# Patient Record
Sex: MALE | Race: WHITE | NOT HISPANIC OR LATINO | Employment: FULL TIME | URBAN - METROPOLITAN AREA
[De-identification: names, ages, dates, MRNs, and addresses within clinical notes are randomized per-mention and may not be internally consistent; named-entity substitution may affect disease eponyms.]

---

## 2020-08-06 LAB
EXTERNAL HIV CONFIRMATION: NORMAL
EXTERNAL HIV SCREEN: NORMAL

## 2022-08-05 NOTE — PROGRESS NOTES
ADULT ANNUAL [address] IN PARTNERSHIP WITH [facility]    NAME: Gideon Kanner  AGE: 42 y o  SEX: male  : 1975     DATE: 2022     Assessment and Plan:     Problem List Items Addressed This Visit    None     Visit Diagnoses     Annual physical exam    -  Primary    Relevant Orders    CBC and differential    Comprehensive metabolic panel    TSH, 3rd generation with Free T4 reflex    Elevated blood pressure reading in office without diagnosis of hypertension        Overweight (BMI 25 0-29  9)        Relevant Orders    Lipid panel    POCT hemoglobin A1c (Completed)    Need for hepatitis C screening test        Relevant Orders    Hepatitis C antibody          Immunizations and preventive care screenings were discussed with patient today  Appropriate education was printed on patient's after visit summary  Discussed elevated blood pressure  Recommend to start bp log at home  Record pressures and keep log to send through [email or web address] in one month  Will recommend sooner follow up if needed for those results  Patient would like to continue with diet and exercise before considering medications  Follow up for bp recheck at next visit  POC a1c was 5 1  Discussed results with patient in office  Counseling:  Counseled on exercise 150 mins 4-5 days a week  Recommend less than 1 gram of sodium daily  Include 4-5 servings of fruits and vegetables for each day's meals  Return in about 6 months (around 2023) for Recheck  Chief Complaint:     Chief Complaint   Patient presents with    Physical Exam    BMI follow up      History of Present Illness:     Adult Annual Physical   Patient here for a comprehensive physical exam  The patient reports no problems  Diet and Physical Activity  · Diet/Nutrition: well balanced diet, consuming 3-5 servings of fruits/vegetables daily and adequate fiber intake     · Exercise: walking, 3-4 times a week on average and 30-60 minutes on average  Depression Screening  PHQ-2/9 Depression Screening    Little interest or pleasure in doing things: 0 - not at all  Feeling down, depressed, or hopeless: 0 - not at all  PHQ-2 Score: 0  PHQ-2 Interpretation: Negative depression screen       General Health  · Sleep: sleeps well  · Hearing: normal - bilateral   · Vision: goes for regular eye exams and wears glasses  · Dental: regular dental visits and brushes teeth twice daily   Health  · Symptoms include: none     Review of Systems:     Review of Systems   HENT: Negative for ear pain  Respiratory: Negative for cough and shortness of breath  Cardiovascular: Negative for chest pain  Gastrointestinal: Negative for abdominal pain, constipation, diarrhea, nausea and vomiting  Genitourinary: Negative for dysuria  Skin: Negative for rash  All other systems reviewed and are negative  Past Medical History:     Past Medical History:   Diagnosis Date    Allergic rhinitis     Chronic colitis     Chronic cough     Diverticulitis 2021    Hyperlipidemia     Hypertension     Oral candidiasis     Serous otitis media     Sinusitis       Past Surgical History:     History reviewed  No pertinent surgical history     Family History:     Family History   Problem Relation Age of Onset    Hypertension Mother     Heart failure Mother     Depression Mother     Heart attack Father       Social History:     Social History     Socioeconomic History    Marital status: /Civil Union     Spouse name: None    Number of children: None    Years of education: None    Highest education level: None   Occupational History    None   Tobacco Use    Smoking status: Former Smoker     Quit date:      Years since quittin 6    Smokeless tobacco: Never Used   Vaping Use    Vaping Use: Never used   Substance and Sexual Activity    Alcohol use: Not Currently    Drug use: Never    Sexual activity: Yes   Other Topics Concern    None   Social History Narrative    None     Social Determinants of Health     Financial Resource Strain: Not on file   Food Insecurity: Not on file   Transportation Needs: Not on file   Physical Activity: Not on file   Stress: Not on file   Social Connections: Not on file   Intimate Partner Violence: Not on file   Housing Stability: Not on file      Current Medications:     No current outpatient medications on file  No current facility-administered medications for this visit  Allergies: Allergies   Allergen Reactions    Clarithromycin Other (See Comments)    Penicillins Rash    Sulfamethoxazole-Trimethoprim Rash      Physical Exam:     /98 (BP Location: Left arm, Patient Position: Sitting, Cuff Size: Large)   Pulse 85   Temp 98 2 °F (36 8 °C)   Ht 6' 1" (1 854 m)   Wt 89 4 kg (197 lb)   SpO2 98%   BMI 25 99 kg/m²     Physical Exam  Vitals and nursing note reviewed  Constitutional:       Appearance: Normal appearance  HENT:      Head: Normocephalic and atraumatic  Right Ear: Tympanic membrane, ear canal and external ear normal       Left Ear: Tympanic membrane, ear canal and external ear normal       Nose: Nose normal       Mouth/Throat:      Mouth: Mucous membranes are moist       Pharynx: Oropharynx is clear  No posterior oropharyngeal erythema  Eyes:      Extraocular Movements: Extraocular movements intact  Conjunctiva/sclera: Conjunctivae normal       Pupils: Pupils are equal, round, and reactive to light  Cardiovascular:      Rate and Rhythm: Normal rate and regular rhythm  Heart sounds: Normal heart sounds  No murmur heard  No friction rub  No gallop  Pulmonary:      Effort: Pulmonary effort is normal  No respiratory distress  Breath sounds: Normal breath sounds  No wheezing  Abdominal:      General: Bowel sounds are normal       Palpations: Abdomen is soft  Tenderness: There is no abdominal tenderness  There is no guarding  Musculoskeletal:         General: Normal range of motion  Cervical back: Normal range of motion and neck supple  Lymphadenopathy:      Cervical: No cervical adenopathy  Skin:     General: Skin is warm and dry  Findings: No erythema or rash  Neurological:      General: No focal deficit present  Mental Status: He is alert and oriented to person, place, and time  Mental status is at baseline  Psychiatric:         Mood and Affect: Mood normal          Behavior: Behavior normal          Thought Content:  Thought content normal          Judgment: Judgment normal           [address] [facility]

## 2022-08-08 ENCOUNTER — TELEPHONE (OUTPATIENT)
Dept: ADMINISTRATIVE | Facility: OTHER | Age: 47
End: 2022-08-08

## 2022-08-08 ENCOUNTER — OFFICE VISIT (OUTPATIENT)
Dept: FAMILY MEDICINE CLINIC | Facility: CLINIC | Age: 47
End: 2022-08-08
Payer: COMMERCIAL

## 2022-08-08 VITALS
TEMPERATURE: 98.2 F | DIASTOLIC BLOOD PRESSURE: 98 MMHG | HEIGHT: 73 IN | HEART RATE: 85 BPM | SYSTOLIC BLOOD PRESSURE: 142 MMHG | OXYGEN SATURATION: 98 % | WEIGHT: 197 LBS | BODY MASS INDEX: 26.11 KG/M2

## 2022-08-08 DIAGNOSIS — Z11.59 NEED FOR HEPATITIS C SCREENING TEST: ICD-10-CM

## 2022-08-08 DIAGNOSIS — E66.3 OVERWEIGHT (BMI 25.0-29.9): ICD-10-CM

## 2022-08-08 DIAGNOSIS — Z00.00 ANNUAL PHYSICAL EXAM: Primary | ICD-10-CM

## 2022-08-08 DIAGNOSIS — R03.0 ELEVATED BLOOD PRESSURE READING IN OFFICE WITHOUT DIAGNOSIS OF HYPERTENSION: ICD-10-CM

## 2022-08-08 LAB — SL AMB POCT HEMOGLOBIN AIC: 5.1 (ref ?–6.5)

## 2022-08-08 PROCEDURE — 3725F SCREEN DEPRESSION PERFORMED: CPT | Performed by: STUDENT IN AN ORGANIZED HEALTH CARE EDUCATION/TRAINING PROGRAM

## 2022-08-08 PROCEDURE — 99386 PREV VISIT NEW AGE 40-64: CPT | Performed by: STUDENT IN AN ORGANIZED HEALTH CARE EDUCATION/TRAINING PROGRAM

## 2022-08-08 PROCEDURE — 83036 HEMOGLOBIN GLYCOSYLATED A1C: CPT | Performed by: STUDENT IN AN ORGANIZED HEALTH CARE EDUCATION/TRAINING PROGRAM

## 2022-08-08 NOTE — TELEPHONE ENCOUNTER
----- Message from Manson Hammans sent at 8/5/2022 12:13 PM EDT -----  Regarding: Care Gap Request  08/05/22 12:13 PM    Hello, our patient has had HIV completed/performed  Please assist in updating the patient chart by pulling the Care Everywhere (CE) document  The date of service is 8/6/2020       Thank you,  Manson Hammans  [address]

## 2022-08-08 NOTE — TELEPHONE ENCOUNTER
Upon review of the In Basket request we were able to locate, review, and update the patient chart as requested for HIV  Any additional questions or concerns should be emailed to the Practice Liaisons via [email or web address] email, please do not reply via In Basket      Thank you  Grayce Hodgkin, MA

## 2023-01-19 ENCOUNTER — OFFICE VISIT (OUTPATIENT)
Dept: FAMILY MEDICINE CLINIC | Facility: CLINIC | Age: 48
End: 2023-01-19

## 2023-01-19 VITALS
SYSTOLIC BLOOD PRESSURE: 124 MMHG | BODY MASS INDEX: 27.1 KG/M2 | OXYGEN SATURATION: 98 % | HEART RATE: 66 BPM | WEIGHT: 204.5 LBS | HEIGHT: 73 IN | DIASTOLIC BLOOD PRESSURE: 76 MMHG

## 2023-01-19 DIAGNOSIS — M54.6 ACUTE RIGHT-SIDED THORACIC BACK PAIN: Primary | ICD-10-CM

## 2023-01-19 PROBLEM — J30.9 ALLERGIC RHINITIS: Status: ACTIVE | Noted: 2023-01-19

## 2023-01-19 PROBLEM — M77.30 CALCANEAL SPUR: Status: ACTIVE | Noted: 2018-07-17

## 2023-01-19 PROBLEM — K57.30 SIGMOID DIVERTICULOSIS: Status: ACTIVE | Noted: 2023-01-19

## 2023-01-19 PROBLEM — M72.2 PLANTAR FASCIAL FIBROMATOSIS: Status: ACTIVE | Noted: 2018-07-17

## 2023-01-19 RX ORDER — CYCLOBENZAPRINE HCL 10 MG
10 TABLET ORAL
Qty: 21 TABLET | Refills: 0
Start: 2023-01-19

## 2023-01-19 RX ORDER — NAPROXEN 500 MG/1
500 TABLET ORAL 2 TIMES DAILY WITH MEALS
Qty: 30 TABLET | Refills: 0
Start: 2023-01-19 | End: 2023-02-03

## 2023-01-19 NOTE — PROGRESS NOTES
Name: Pat Varela      : 1975      MRN: [id number]  Encounter Provider: FELIPE Noe  Encounter Date: 2023   Encounter department: [address] [place] [address] in order of likelihood include: right rib fracture/contusion vs muscular strain/sprain  Cardiac and pulmonary physical exam are benign, vitals are stable  Educated patient that most instances of rib trauma do not require imaging or surgical interventions  Provided patient with the following medications from our office supply: 15-day regimen of naproxen and Flexeril as needed at bedtime  Provided patient educational handouts on the management of rib and back pain, encouraged patient to avoid any further trauma to the area  Advised patient to follow-up with   [facility] if his condition fails to improve after 2 weeks of treatment  1  Acute right-sided thoracic back pain  -     naproxen (Naprosyn) 500 mg tablet; Take 1 tablet (500 mg total) by mouth 2 (two) times a day with meals for 15 days  -     cyclobenzaprine (FLEXERIL) 10 mg tablet; Take 1 tablet (10 mg total) by mouth daily at bedtime as needed for muscle spasms         Subjective      Right Back Pain  Patient presents for evaluation of low back problems  Symptoms have been present for 1 week and include radiation to chest at night  Initial inciting event: hit back when demonstrating high jump  Symptoms are worse at nighttime  Alleviating factors identifiable by the patient are Advil  Aggravating factors identifiable by the patient are laying and walking down stairs  Treatments initiated by the patient: Advil    minimal improvement  Previous lower back problems: "pulled lower back when younger"  Previous work up: none  Previous treatments: none      Sharp/stabbing pain, at worse 8-9/10 reported pain, 2/10 at best  Hurts with deep breathing and coughing        Review of Systems Constitutional: Negative  HENT: Negative  Eyes: Negative  Respiratory: Negative  Cardiovascular: Positive for chest pain  Gastrointestinal: Negative  Negative for constipation  Endocrine: Negative  Genitourinary: Negative  Negative for dysuria  Musculoskeletal: Positive for back pain  Skin: Negative  Allergic/Immunologic: Negative  Neurological: Negative  Negative for weakness and numbness  Hematological: Negative  Psychiatric/Behavioral: Negative  No current outpatient medications on file prior to visit  Objective     /76 (BP Location: Left arm, Patient Position: Sitting, Cuff Size: Large)   Pulse 66   Ht 6' 1" (1 854 m)   Wt 92 8 kg (204 lb 8 oz)   SpO2 98%   BMI 26 98 kg/m²     Physical Exam  Constitutional:       General: He is not in acute distress  Appearance: Normal appearance  He is not ill-appearing  Eyes:      Extraocular Movements: Extraocular movements intact  Conjunctiva/sclera: Conjunctivae normal    Cardiovascular:      Rate and Rhythm: Normal rate and regular rhythm  Pulses: Normal pulses  Heart sounds: Normal heart sounds  Pulmonary:      Effort: Pulmonary effort is normal  No respiratory distress  Breath sounds: Normal breath sounds  No wheezing  Abdominal:      General: Abdomen is flat  Musculoskeletal:         General: Normal range of motion  Cervical back: Normal and normal range of motion  Thoracic back: Swelling and tenderness present  No deformity or bony tenderness  Normal range of motion  Lumbar back: Normal  No swelling, edema, deformity, signs of trauma, tenderness or bony tenderness  Normal range of motion  Negative right straight leg raise test and negative left straight leg raise test         Back:       Comments: Circled area: Location of tenderness and mild swelling, no bruising or redness noted   Skin:     Findings: No erythema or rash     Neurological:      Mental Status: He is alert and oriented to person, place, and time     Psychiatric:         Mood and Affect: Mood normal          Behavior: Behavior normal        FELIPE Chong

## 2023-01-19 NOTE — PATIENT INSTRUCTIONS
Back Pain   AMBULATORY CARE:   Back pain  is common  You may have back pain and muscle spasms  You may feel sore or stiff on one or both sides of your back  The pain may spread to your lower body  Conditions that affect the spine, joints, or muscles can cause back pain  These may include arthritis, spinal stenosis (narrowing of the spinal column), muscle tension, or breakdown of the spinal discs  Call your local emergency number (911 in the [address] [facility]) if:   You have severe back pain with chest pain  You cannot control your urine or bowel movements  Your pain becomes so severe that you cannot walk  Seek care immediately if:   You have pain, numbness, or weakness in one or both legs  You have severe back pain, nausea, and vomiting  You have severe back pain that spreads to your side or genital area  Call your doctor if:   You have back pain that does not get better with rest and pain medicine  You have a fever  You have pain that worsens when you are on your back or when you rest     You have pain that worsens when you cough or sneeze  You lose weight without trying  You have questions or concerns about your condition or care  Medicines: You may need any of the following:  NSAIDs , such as ibuprofen, help decrease swelling, pain, and fever  This medicine is available with or without a doctor's order  NSAIDs can cause stomach bleeding or kidney problems in certain people  If you take blood thinner medicine, always ask your healthcare provider if NSAIDs are safe for you  Always read the medicine label and follow directions  Acetaminophen  decreases pain and fever  It is available without a doctor's order  Ask how much to take and how often to take it  Follow directions  Read the labels of all other medicines you are using to see if they also contain acetaminophen, or ask your doctor or pharmacist  Acetaminophen can cause liver damage if not taken correctly   Do not use more than 4 grams (4,000 milligrams) total of acetaminophen in one day  Muscle relaxers  help decrease muscle spasms and back pain  Take your medicine as directed  Contact your healthcare provider if you think your medicine is not helping or if you have side effects  Tell him or her if you are allergic to any medicine  Keep a list of the medicines, vitamins, and herbs you take  Include the amounts, and when and why you take them  Bring the list or the pill bottles to follow-up visits  Carry your medicine list with you in case of an emergency  Manage your back pain:   Apply ice  on your back for 15 to 20 minutes every hour or as directed  Use an ice pack, or put crushed ice in a plastic bag  Cover it with a towel before you apply it to your skin  Ice helps prevent tissue damage and decreases pain  Apply heat  on your back for 20 to 30 minutes every 2 hours for as many days as directed  Heat helps decrease pain and muscle spasms  Stay active  as much as you can without causing more pain  Bed rest could make your back pain worse  Avoid heavy lifting until your pain is gone  Go to physical therapy as directed  A physical therapist can teach you exercises to help improve movement and strength, and to decrease pain  Follow up with your doctor in 2 weeks, or as directed: You might need to see a specialist  Write down your questions so you remember to ask them during your visits  © Copyright [employer] 2022 Information is for End User's use only and may not be sold, redistributed or otherwise used for commercial purposes  All illustrations and images included in CareNotes® are the copyrighted property of A D A M [employer] Inc  or Keke Nayak   The above information is an  only  It is not intended as medical advice for individual conditions or treatments  Talk to your doctor, nurse or pharmacist before following any medical regimen to see if it is safe and effective for you

## 2023-07-19 LAB
ALBUMIN SERPL-MCNC: 4.7 G/DL (ref 3.6–5.1)
ALBUMIN/GLOB SERPL: 1.6 (CALC) (ref 1–2.5)
ALP SERPL-CCNC: 68 U/L (ref 36–130)
ALT SERPL-CCNC: 25 U/L (ref 9–46)
AST SERPL-CCNC: 25 U/L (ref 10–40)
BASOPHILS # BLD AUTO: 22 CELLS/UL (ref 0–200)
BASOPHILS NFR BLD AUTO: 0.4 %
BILIRUB SERPL-MCNC: 0.7 MG/DL (ref 0.2–1.2)
BUN SERPL-MCNC: 14 MG/DL (ref 7–25)
BUN/CREAT SERPL: ABNORMAL (CALC) (ref 6–22)
CALCIUM SERPL-MCNC: 9.8 MG/DL (ref 8.6–10.3)
CHLORIDE SERPL-SCNC: 104 MMOL/L (ref 98–110)
CHOLEST SERPL-MCNC: 251 MG/DL
CHOLEST/HDLC SERPL: 5.7 (CALC)
CO2 SERPL-SCNC: 27 MMOL/L (ref 20–32)
CREAT SERPL-MCNC: 0.92 MG/DL (ref 0.6–1.29)
EOSINOPHIL # BLD AUTO: 121 CELLS/UL (ref 15–500)
EOSINOPHIL NFR BLD AUTO: 2.2 %
ERYTHROCYTE [DISTWIDTH] IN BLOOD BY AUTOMATED COUNT: 12.5 % (ref 11–15)
GFR/BSA.PRED SERPLBLD CYS-BASED-ARV: 105 ML/MIN/1.73M2
GLOBULIN SER CALC-MCNC: 3 G/DL (CALC) (ref 1.9–3.7)
GLUCOSE SERPL-MCNC: 106 MG/DL (ref 65–99)
HCT VFR BLD AUTO: 45.3 % (ref 38.5–50)
HCV AB SERPL QL IA: NORMAL
HDLC SERPL-MCNC: 44 MG/DL
HGB BLD-MCNC: 15.7 G/DL (ref 13.2–17.1)
LDLC SERPL CALC-MCNC: 161 MG/DL (CALC)
LYMPHOCYTES # BLD AUTO: 1870 CELLS/UL (ref 850–3900)
LYMPHOCYTES NFR BLD AUTO: 34 %
MCH RBC QN AUTO: 30.3 PG (ref 27–33)
MCHC RBC AUTO-ENTMCNC: 34.7 G/DL (ref 32–36)
MCV RBC AUTO: 87.3 FL (ref 80–100)
MONOCYTES # BLD AUTO: 616 CELLS/UL (ref 200–950)
MONOCYTES NFR BLD AUTO: 11.2 %
NEUTROPHILS # BLD AUTO: 2871 CELLS/UL (ref 1500–7800)
NEUTROPHILS NFR BLD AUTO: 52.2 %
NONHDLC SERPL-MCNC: 207 MG/DL (CALC)
PLATELET # BLD AUTO: 320 THOUSAND/UL (ref 140–400)
PMV BLD REES-ECKER: 9.6 FL (ref 7.5–12.5)
POTASSIUM SERPL-SCNC: 5 MMOL/L (ref 3.5–5.3)
PROT SERPL-MCNC: 7.7 G/DL (ref 6.1–8.1)
RBC # BLD AUTO: 5.19 MILLION/UL (ref 4.2–5.8)
SODIUM SERPL-SCNC: 140 MMOL/L (ref 135–146)
TRIGL SERPL-MCNC: 299 MG/DL
TSH SERPL-ACNC: 2.56 MIU/L (ref 0.4–4.5)
WBC # BLD AUTO: 5.5 THOUSAND/UL (ref 3.8–10.8)

## 2023-07-30 PROBLEM — E78.5 HYPERLIPIDEMIA: Status: ACTIVE | Noted: 2023-07-30

## 2023-07-30 NOTE — PROGRESS NOTES
ADULT ANNUAL [facility] [place]    NAME: Saul Miguel  AGE: 41 y.o. SEX: male  : 1975     DATE: 2023     Assessment and Plan:     Problem List Items Addressed This Visit        Other    Hyperlipidemia    Relevant Orders    CBC and differential    Comprehensive metabolic panel    Lipid panel    POCT hemoglobin A1c (Completed)   Other Visit Diagnoses     Annual physical exam    -  Primary    Relevant Orders    CBC and differential    Comprehensive metabolic panel    Overweight (BMI 25.0-29. 9)        Relevant Orders    CBC and differential    Comprehensive metabolic panel    Lipid panel    POCT hemoglobin A1c (Completed)    Colon cancer screening        Colonoscopy reported in  done by [facility]    Postnasal drip        Commend to continue Sudafed. Immunizations and preventive care screenings were discussed with patient today. Appropriate education was printed on patient's after visit summary. Counseling:  Alcohol/drug use: discussed moderation in alcohol intake, the recommendations for healthy alcohol use, and avoidance of illicit drug use. Dental Health: discussed importance of regular tooth brushing, flossing, and dental visits. [facility]  · Exercise: the importance of regular exercise/physical activity was discussed. Recommend exercise 3-5 times per week for at least 30 minutes. BMI Counseling: Body mass index is 27.42 kg/m². The BMI is above normal. Nutrition recommendations include encouraging healthy choices of fruits and vegetables. Exercise recommendations include moderate physical activity 150 minutes/week and exercising 3-5 times per week. Rationale for BMI follow-up plan is due to patient being overweight or obese. Depression Screening and Follow-up Plan: Patient was screened for depression during today's encounter.  They screened negative with a PHQ-2 score of 0. Patient was seen today for an annual physical exam. Age appropriate screening tests were done as above. Annual labs were ordered to be done through [employer]. Patient will be contacted regarding results and follow up will be based on findings. Patient was counseled on the importance of proper diet and exercise. I recommend diets rich in lean meats and leafy green vegetables. Try to have 150 minutes of exercise weekly at moderate intensity. See problem based charting for any chronic problems or new findings and current workup/management. Patient was provided counseling including using my fitness pal and tracking average calories. He should decrease his average amount by 500 and continue to exercise 5 times a week if possible for 30 minutes. 40 minutes were spent on chart review, examination, and plan of care. This note was dictated using software. Return in about 1 year (around 8/9/2024) for Annual physical.     Chief Complaint:     Chief Complaint   Patient presents with   • Physical Exam     Annual and a1c        History of Present Illness:     Adult Annual Physical   Patient here for a comprehensive physical exam. The patient reports annual.    Has had a cough, nasal drip for the past 10 days. His daughter has been sick. He is having some sore throat due to drainage. Diet and Physical Activity  · Diet/Nutrition: oatmeal with peanut butter, dannon light yogurt, sandwich lunch, salads during schoolyear, dinner starch and protein. · Exercise: moderate cardiovascular exercise and hiit training 3 on 1 off. Depression Screening  PHQ-2/9 Depression Screening    Little interest or pleasure in doing things: 0 - not at all  Feeling down, depressed, or hopeless: 0 - not at all  PHQ-2 Score: 0  PHQ-2 Interpretation: Negative depression screen       General Health  · Sleep: sleeps well. · Hearing: normal - bilateral.  · Vision: wears glasses.    · Dental: regular dental visits.  Health  · Symptoms include: none     Review of Systems:     Review of Systems   Constitutional: Negative for fever. HENT: Positive for sore throat. Respiratory: Positive for cough. Negative for shortness of breath. Cardiovascular: Negative for chest pain. Gastrointestinal: Negative for abdominal pain, constipation and diarrhea. Genitourinary: Negative for difficulty urinating. Skin: Negative for rash. Past Medical History:     Past Medical History:   Diagnosis Date   • Allergic rhinitis    • Chronic colitis    • Chronic cough    • Diverticulitis 2021   • Hyperlipidemia    • Hypertension    • Oral candidiasis    • Serous otitis media    • Sinusitis       Past Surgical History:     No past surgical history on file. Family History:     Family History   Problem Relation Age of Onset   • Hypertension Mother    • Heart failure Mother    • Depression Mother    • Heart attack Father       Social History:     Social History     Socioeconomic History   • Marital status: /Civil Union     Spouse name: None   • Number of children: None   • Years of education: None   • Highest education level: None   Occupational History   • None   Tobacco Use   • Smoking status: Former     Types: Cigarettes     Quit date:      Years since quittin.6   • Smokeless tobacco: Never   Vaping Use   • Vaping Use: Never used   Substance and Sexual Activity   • Alcohol use: Not Currently   • Drug use: Never   • Sexual activity: Yes   Other Topics Concern   • None   Social History Narrative   • None     Social Determinants of Health     Financial Resource Strain: Not on file   Food Insecurity: Not on file   Transportation Needs: Not on file   Physical Activity: Not on file   Stress: Not on file   Social Connections: Not on file   Intimate Partner Violence: Not on file   Housing Stability: Not on file      Current Medications:     No current outpatient medications on file.      No current facility-administered medications for this visit. Allergies: Allergies   Allergen Reactions   • Clarithromycin Other (See Comments)   • Penicillins Rash   • Sulfamethoxazole-Trimethoprim Rash      Physical Exam:     /87 (BP Location: Left arm, Patient Position: Sitting, Cuff Size: Standard)   Pulse 63   Ht 6' 1" (1.854 m)   Wt 94.3 kg (207 lb 12.8 oz)   SpO2 98%   BMI 27.42 kg/m²     Physical Exam  Constitutional:       General: He is not in acute distress. Appearance: Normal appearance. HENT:      Head: Normocephalic and atraumatic. Right Ear: Tympanic membrane, ear canal and external ear normal.      Left Ear: Tympanic membrane, ear canal and external ear normal.      Nose: Nose normal. No congestion. Mouth/Throat:      Mouth: Mucous membranes are moist.      Pharynx: Oropharynx is clear. No oropharyngeal exudate or posterior oropharyngeal erythema. Eyes:      Extraocular Movements: Extraocular movements intact. Conjunctiva/sclera: Conjunctivae normal.      Pupils: Pupils are equal, round, and reactive to light. Cardiovascular:      Rate and Rhythm: Normal rate and regular rhythm. Heart sounds: Normal heart sounds. No murmur heard. No friction rub. No gallop. Pulmonary:      Effort: Pulmonary effort is normal. No respiratory distress. Breath sounds: Normal breath sounds. No wheezing. Abdominal:      General: Bowel sounds are normal.      Palpations: Abdomen is soft. Tenderness: There is no abdominal tenderness. There is no guarding. Musculoskeletal:         General: Normal range of motion. Cervical back: Normal range of motion. Skin:     General: Skin is warm and dry. Findings: No erythema or rash. Neurological:      General: No focal deficit present. Mental Status: He is alert and oriented to person, place, and time. Mental status is at baseline.    Psychiatric:         Mood and Affect: Mood normal.         Behavior: Behavior normal.         Thought Content:  Thought content normal.         Judgment: Judgment normal.          Kg Morton, DO  [address] WITH [facility]

## 2023-08-09 ENCOUNTER — OFFICE VISIT (OUTPATIENT)
Dept: FAMILY MEDICINE CLINIC | Facility: CLINIC | Age: 48
End: 2023-08-09

## 2023-08-09 VITALS
HEIGHT: 73 IN | BODY MASS INDEX: 27.54 KG/M2 | SYSTOLIC BLOOD PRESSURE: 138 MMHG | HEART RATE: 63 BPM | OXYGEN SATURATION: 98 % | WEIGHT: 207.8 LBS | DIASTOLIC BLOOD PRESSURE: 87 MMHG

## 2023-08-09 DIAGNOSIS — E66.3 OVERWEIGHT (BMI 25.0-29.9): ICD-10-CM

## 2023-08-09 DIAGNOSIS — Z12.11 COLON CANCER SCREENING: ICD-10-CM

## 2023-08-09 DIAGNOSIS — R09.82 POSTNASAL DRIP: ICD-10-CM

## 2023-08-09 DIAGNOSIS — E78.5 HYPERLIPIDEMIA, UNSPECIFIED HYPERLIPIDEMIA TYPE: ICD-10-CM

## 2023-08-09 DIAGNOSIS — Z00.00 ANNUAL PHYSICAL EXAM: Primary | ICD-10-CM

## 2023-08-09 LAB — SL AMB POCT HEMOGLOBIN AIC: 5.5 (ref ?–6.5)

## 2023-08-09 PROCEDURE — 99396 PREV VISIT EST AGE 40-64: CPT | Performed by: STUDENT IN AN ORGANIZED HEALTH CARE EDUCATION/TRAINING PROGRAM

## 2023-08-09 PROCEDURE — 83036 HEMOGLOBIN GLYCOSYLATED A1C: CPT | Performed by: STUDENT IN AN ORGANIZED HEALTH CARE EDUCATION/TRAINING PROGRAM

## 2023-08-11 ENCOUNTER — TELEPHONE (OUTPATIENT)
Dept: ADMINISTRATIVE | Facility: OTHER | Age: 48
End: 2023-08-11

## 2023-08-11 NOTE — LETTER
Please send full report    Procedure Request Form: Colonoscopy      Date Requested: 23  Patient: Remi Rahman  Patient : 1975   Referring Provider: Lyndon Corral, DO        Date of Procedure ______________________________       The above patient has informed us that they have completed their   most recent Colonoscopy at your facility. Please complete   this form and attach all corresponding procedure reports/results. Comments __________________________________________________________  ____________________________________________________________________  ____________________________________________________________________  ____________________________________________________________________    Facility Completing Procedure _________________________________________    Form Completed By (print name) _______________________________________      Signature __________________________________________________________      These reports are needed for  compliance. Please fax this completed form and a copy of the procedure report to our office located at [address] [place] [address] as soon as possible to Fax [phone] dora Lua: Phone [phone]    We thank you for your assistance in treating our mutual patient.

## 2023-08-11 NOTE — TELEPHONE ENCOUNTER
Upon review of the In Basket request we have found/obtained the documentation. After careful review of the document we are unable to complete this request for CRC: Colonoscopy because the documentation does not have the proper verbiage (wording) needed to close the requested care gap(s). Upon review of the In Basket request and the patient's chart, initial outreach has been made via fax to facility. Please see Contacts section for details. Thank you  Clemente Lewis     Any additional questions or concerns should be emailed to [facility] via the appropriate education email address, please do not reply via In Basket.     Thank you  Clemente Lewis

## 2023-08-11 NOTE — LETTER
Procedure Request Form: Colonoscopy      Date Requested: 23  Patient: Wolf Sheets  Patient : 1975   Referring Provider: Juice Chol, DO        Date of Procedure ______________________________       The above patient has informed us that they have completed their   most recent Colonoscopy at your facility. Please complete   this form and attach all corresponding procedure reports/results. Comments __________________________________________________________  ____________________________________________________________________  ____________________________________________________________________  ____________________________________________________________________    Facility Completing Procedure _________________________________________    Form Completed By (print name) _______________________________________      Signature __________________________________________________________      These reports are needed for  compliance. Please fax this completed form and a copy of the procedure report to our office located at [address] [place] [address] as soon as possible to Fax [phone] attention Pilar Clarke: Phone [phone]    We thank you for your assistance in treating our mutual patient.

## 2023-08-11 NOTE — TELEPHONE ENCOUNTER
----- Message from Alberta Gitelman sent at 8/10/2023  2:53 PM EDT -----  Regarding: care gap request  01/04/23 1:48 PM    Hello, our patient attached above CRC: Colonoscopy completed/performed. Please assist in updating the patient chart by pulling the document from the Media Tab. The date of service is 8/9/23.      Thank you,  Alberta Gitelman  [facility]

## 2023-08-14 NOTE — TELEPHONE ENCOUNTER
Upon review of the In Basket request we have found this is a duplicate request and no further action is needed. This request was completed upon initial request, the patient chart is up to date, and this message will now be closed. Any additional questions or concerns should be emailed to the Practice Liaisons via the appropriate education email address, please do not reply via In Basket.     Thank you  Rajwinder Edwards

## 2023-08-15 ENCOUNTER — TELEPHONE (OUTPATIENT)
Dept: ADMINISTRATIVE | Facility: OTHER | Age: 48
End: 2023-08-15

## 2023-08-15 NOTE — TELEPHONE ENCOUNTER
----- Message from Rosette Wills sent at 8/14/2023  3:07 PM EDT -----  Regarding: care gap request  01/04/23 1:48 PM    Hello, our patient attached above CRC: Colonoscopy completed/performed. Please assist in updating the patient chart by pulling the document from the Media Tab. The date of service is 8/9/2023.      Thank you,  Rosette Wills  [address] [facility]

## 2023-08-15 NOTE — TELEPHONE ENCOUNTER
Upon review of the In Basket request we have found/obtained the documentation. After careful review of the document we are unable to complete this request for CRC: Colonoscopy because the documentation does not have the result(s) needed to close the requested care gap(s). We need Operative report     Any additional questions or concerns should be emailed to the Practice Liaisons via the appropriate education email address, please do not reply via In Basket.     Thank you  Jackson Becerra MA

## 2024-01-04 DIAGNOSIS — U07.1 COVID-19: Primary | ICD-10-CM

## 2024-01-04 DIAGNOSIS — R05.1 ACUTE COUGH: ICD-10-CM

## 2024-01-04 RX ORDER — BENZONATATE 200 MG/1
200 CAPSULE ORAL 3 TIMES DAILY PRN
Qty: 30 CAPSULE | Refills: 0 | Status: SHIPPED | OUTPATIENT
Start: 2024-01-04

## 2024-05-08 ENCOUNTER — TELEMEDICINE (OUTPATIENT)
Dept: FAMILY MEDICINE CLINIC | Facility: CLINIC | Age: 49
End: 2024-05-08

## 2024-05-08 DIAGNOSIS — K52.9 GASTROENTERITIS: Primary | ICD-10-CM

## 2024-05-08 PROCEDURE — 99213 OFFICE O/P EST LOW 20 MIN: CPT | Performed by: STUDENT IN AN ORGANIZED HEALTH CARE EDUCATION/TRAINING PROGRAM

## 2024-05-08 RX ORDER — LOPERAMIDE HYDROCHLORIDE 2 MG/1
2 CAPSULE ORAL 4 TIMES DAILY PRN
Qty: 60 CAPSULE | Refills: 2 | Status: CANCELLED | OUTPATIENT
Start: 2024-05-08

## 2024-05-08 RX ORDER — CIPROFLOXACIN 500 MG/1
500 TABLET, FILM COATED ORAL EVERY 12 HOURS SCHEDULED
Qty: 10 TABLET | Refills: 0 | Status: CANCELLED | OUTPATIENT
Start: 2024-05-08 | End: 2024-05-13

## 2024-05-08 NOTE — PROGRESS NOTES
Virtual Regular Visit    Verification of patient location:    Patient is located at Other in the following state in which I hold an active license [place]      Assessment/Plan:    Problem List Items Addressed This Visit    None  Visit Diagnoses       Gastroenteritis    -  Primary                 Patient is a 48-year-old male presenting today with symptoms of likely gastroenteritis.  He had a flareup of the diarrhea since he ate poorly yesterday with high fatty foods.  I recommended that he go back to a brat diet with bland foods.  He is replenishing his electrolytes with Gatorade.  Due to his history with diverticulitis I recommended trying Pepto-Bismol to help with his diarrhea and if things do not improve then he can try a small amount of Imodium.  Did warn him that Pepto-Bismol can turn the stool black and it is not anything to panic about. I did recommend that if he does not have any improvement in the next several days we should do a stool test.  He will let me know if things have not resolved.    Discussed to monitor for including signs of dehydration,  severe abdominal pain  that would require urgent evaluation.  Advised him to push plenty of fluids including replenishing electrolytes.    15 minutes spent on documentation, physical exam and plan of care.  This note was dictated using software.    Reason for visit is No chief complaint on file.       Encounter provider Lior Muniz, DO      Recent Visits  No visits were found meeting these conditions.  Showing recent visits within past 7 days and meeting all other requirements  Future Appointments  No visits were found meeting these conditions.  Showing future appointments within next 150 days and meeting all other requirements       The patient was identified by name and date of birth. Karl Shaikh was informed that this is a telemedicine visit and that the visit is being conducted through the Epic Embedded platform. He agrees to proceed..  My office door was  closed. No one else was in the room.  He acknowledged consent and understanding of privacy and security of the video platform. The patient has agreed to participate and understands they can discontinue the visit at any time.    Patient is aware this is a billable service.     Subjective  Karl Shaikh is a 48 y.o. male presenting for acute visit due to diarrhea .    Patient complains of diarrhea. Onset of diarrhea was 7 days ago. Diarrhea is occurring approximately 15 times per day. Patient describes diarrhea as watery. Diarrhea has been associated with none. Patient denies blood in stool, fever, recent antibiotic use, recent camping, recent travel, significant abdominal pain. Previous visits for diarrhea: none. Evaluation to date: none. Treatment to date: none.    Patient reports that this started last Wednesday with some throwing up and mild diarrhea.  By Thursday his symptoms had improved significantly and he started to try and advance his diet.  He was doing well and then yesterday he went to a banquet where he ate a lot of fatty foods and meat.  Today he has had 15 small episodes of diarrhea.  Overall he feels that he is improving but the diarrhea flared back up after his dinner last night.  He denies any fevers at home or other abnormal symptoms as above.  He has a history of diverticulitis but this does not feel similar to previous episodes.  He is wondering what he can take at home to help with this      HPI     Past Medical History:   Diagnosis Date    Allergic rhinitis     Chronic colitis     Chronic cough     Diverticulitis 04/19/2021    Hyperlipidemia     Hypertension     Oral candidiasis     Serous otitis media     Sinusitis        No past surgical history on file.    Current Outpatient Medications   Medication Sig Dispense Refill    benzonatate (TESSALON) 200 MG capsule Take 1 capsule (200 mg total) by mouth 3 (three) times a day as needed for cough 30 capsule 0     No current facility-administered  medications for this visit.        Allergies   Allergen Reactions    Clarithromycin Other (See Comments)    Penicillins Rash    Sulfamethoxazole-Trimethoprim Rash       Review of Systems   Respiratory:  Negative for shortness of breath.    Cardiovascular:  Negative for chest pain.   Gastrointestinal:  Positive for diarrhea. Negative for abdominal pain, constipation, nausea and vomiting.   Genitourinary:  Negative for difficulty urinating.   Skin:  Negative for rash.       Video Exam    There were no vitals filed for this visit.    Physical Exam  Vitals and nursing note reviewed.   Constitutional:       General: He is not in acute distress.     Appearance: Normal appearance. He is not ill-appearing or toxic-appearing.   HENT:      Right Ear: External ear normal.      Left Ear: External ear normal.      Nose: No congestion.   Eyes:      Extraocular Movements: Extraocular movements intact.      Conjunctiva/sclera: Conjunctivae normal.      Pupils: Pupils are equal, round, and reactive to light.   Pulmonary:      Effort: Pulmonary effort is normal. No respiratory distress.   Musculoskeletal:         General: Normal range of motion.      Cervical back: Normal range of motion.   Neurological:      General: No focal deficit present.      Mental Status: He is alert and oriented to person, place, and time. Mental status is at baseline.   Psychiatric:         Mood and Affect: Mood normal.         Behavior: Behavior normal.         Thought Content: Thought content normal.         Judgment: Judgment normal.          Visit Time  Total Visit Duration: 15

## 2024-06-04 ENCOUNTER — OFFICE VISIT (OUTPATIENT)
Age: 49
End: 2024-06-04

## 2024-06-04 VITALS
BODY MASS INDEX: 26.93 KG/M2 | HEIGHT: 73 IN | SYSTOLIC BLOOD PRESSURE: 140 MMHG | OXYGEN SATURATION: 99 % | TEMPERATURE: 98.9 F | HEART RATE: 95 BPM | DIASTOLIC BLOOD PRESSURE: 78 MMHG | WEIGHT: 203.2 LBS

## 2024-06-04 DIAGNOSIS — J02.9 ACUTE PHARYNGITIS, UNSPECIFIED ETIOLOGY: Primary | ICD-10-CM

## 2024-06-04 LAB — S PYO AG THROAT QL: NEGATIVE

## 2024-06-04 PROCEDURE — 99213 OFFICE O/P EST LOW 20 MIN: CPT | Performed by: NURSE PRACTITIONER

## 2024-06-04 PROCEDURE — 87880 STREP A ASSAY W/OPTIC: CPT | Performed by: NURSE PRACTITIONER

## 2024-06-04 RX ORDER — AZITHROMYCIN 250 MG/1
TABLET, FILM COATED ORAL
Start: 2024-06-04 | End: 2024-06-08

## 2024-06-04 NOTE — PROGRESS NOTES
Ambulatory Visit  Name: Karl Shaikh      : 1975      MRN: [id number]  Encounter Provider: FELIPE Ponce  Encounter Date: 2024   Encounter department: [facility] PARTNERSHIP WITH [facility]    Assessment & Plan   1. Acute pharyngitis, unspecified etiology  Assessment & Plan:  Strep test is negative in office. Given length of sx and progression of sx will treat with azithromycin today. Pt reports he has taken this medication before with no SE or reactions.   Increase fluid intake and get plenty of rest.          If you have nasal congestions, post nasal drip, sinus pressure, runny nose you may try the following:        Clearing your sinuses in a nice steamy shower or in bathroom filled with steamy air.     Nasal saline rinses every 1-2 hours while awake may also help decrease nasal congestion, drainage.     You may try Mucinex D 12 hour version.  1/2 to 1 tablet one to two times a day as needed for nasal congestion, runny nose, post nasal drip, or cough.     If you have sore / scratch / irritated throat, you may try the following:          Warm salt water gargles every 1-2 hours while awake, throat lozenges, Tylenol and/or ibuprofen.       Please note that a cough is not necessarily a bad thing.  It is the body's way of protecting the airways.  If a cough is keeping you from sleeping at night, you may use cough suppressant such as Delsym Cough Syrup, NyQuil, Robitussin DM.       Most upper respiratory symptoms start to improve after 7-10 days but may take a few weeks to completely resolve.     You may also use Ibuprofen or Acetaminophen containing product for symptom relief.   Follow up in 1 week if your symptoms persist or worsen.    Please call the office if you have any questions.   The patient verbalized understanding of treatment plan.     Orders:  -     azithromycin (ZITHROMAX) 250 mg tablet; Take 2 tablets today then 1 tablet daily x 4 days  -      POCT rapid ANTIGEN strepA      Depression Screening and Follow-up Plan: Patient was screened for depression during today's encounter. They screened negative with a PHQ-2 score of 0.      History of Present Illness   {Disappearing Hyperlinks I Encounters * My Last Note * Since Last Visit * History :82828}  Pt here today with concerns of sore throat, ear pain and sinus pressure for over a week which is worsening. Pt states he is experiencing left ear pain, and muffled hearing in the right ear. He states he has painful swallowing. He denies fevers, cough, N/V/D. He reports he tested negative for COVID at home.     Sore Throat   Associated symptoms include congestion, ear discharge, ear pain, neck pain, swollen glands and trouble swallowing. Pertinent negatives include no abdominal pain, coughing, diarrhea, headaches, plugged ear sensation, shortness of breath, stridor or vomiting. He has had no exposure to strep or mono. Treatments tried: Allegra, robitussin, sudafed. The treatment provided moderate relief.   Fatigue  Associated symptoms include congestion, fatigue, neck pain, a sore throat and swollen glands. Pertinent negatives include no abdominal pain, chest pain, coughing, diaphoresis, fever, headaches or vomiting.   Earache   Associated symptoms include ear discharge, neck pain and a sore throat. Pertinent negatives include no abdominal pain, coughing, diarrhea, headaches, rhinorrhea or vomiting.       Review of Systems   Constitutional:  Positive for fatigue. Negative for activity change, appetite change, diaphoresis, fever and unexpected weight change.   HENT:  Positive for congestion, ear discharge, ear pain, postnasal drip, sinus pressure, sinus pain, sore throat and trouble swallowing. Negative for nosebleeds, rhinorrhea, sneezing, tinnitus and voice change.    Respiratory:  Negative for apnea, cough, choking, chest tightness, shortness of breath, wheezing and stridor.    Cardiovascular: Negative.   "Negative for chest pain and leg swelling.   Gastrointestinal:  Negative for abdominal pain, diarrhea and vomiting.   Musculoskeletal:  Positive for neck pain.   Neurological: Negative.  Negative for headaches.     Medical History Reviewed by provider this encounter:       Objective   {Disappearing Hyperlinks   Review Vitals * Enter New Vitals * Results Review * Labs * Imaging * Cardiology * Procedures * Lung Cancer Screening :53927}  /78 (BP Location: Left arm, Patient Position: Sitting, Cuff Size: Large)   Pulse 95   Temp 98.9 °F (37.2 °C)   Ht 6' 1\" (1.854 m)   Wt 92.2 kg (203 lb 3.2 oz)   SpO2 99%   BMI 26.81 kg/m²     Physical Exam  Vitals and nursing note reviewed.   Constitutional:       General: He is not in acute distress.     Appearance: Normal appearance. He is not ill-appearing.   HENT:      Head: Normocephalic and atraumatic.      Right Ear: Tympanic membrane, ear canal and external ear normal. No drainage, swelling or tenderness. Tympanic membrane is not injected, erythematous, retracted or bulging.      Left Ear: Hearing, ear canal and external ear normal. No drainage, swelling or tenderness. Tympanic membrane is injected. Tympanic membrane is not erythematous, retracted or bulging.      Nose: Nose normal. No congestion or rhinorrhea.      Right Turbinates: Not enlarged.      Left Turbinates: Not enlarged.      Mouth/Throat:      Mouth: Mucous membranes are moist.      Pharynx: Posterior oropharyngeal erythema present. No oropharyngeal exudate.   Eyes:      General:         Right eye: No discharge.         Left eye: No discharge.      Conjunctiva/sclera: Conjunctivae normal.   Cardiovascular:      Rate and Rhythm: Normal rate and regular rhythm.      Heart sounds: Normal heart sounds.   Pulmonary:      Effort: Pulmonary effort is normal. No respiratory distress.      Breath sounds: Normal breath sounds. No stridor. No wheezing, rhonchi or rales.   Chest:      Chest wall: No tenderness. "   Abdominal:      General: Bowel sounds are normal.      Palpations: Abdomen is soft.   Musculoskeletal:         General: Normal range of motion.      Cervical back: Normal range of motion.      Right lower leg: No edema.      Left lower leg: No edema.   Lymphadenopathy:      Cervical: No cervical adenopathy.   Skin:     General: Skin is warm and dry.   Neurological:      Mental Status: He is alert and oriented to person, place, and time.   Psychiatric:         Mood and Affect: Mood normal.         Behavior: Behavior normal.         Thought Content: Thought content normal.         Judgment: Judgment normal.       Administrative Statements {Disappearing Hyperlinks I  [facility]  I have spent a total time of 16 minutes on 06/04/24 In caring for this patient including Patient and family education, Impressions, Documenting in the medical record, and Reviewing / ordering tests, medicine, procedures  .

## 2024-06-04 NOTE — ASSESSMENT & PLAN NOTE
Strep test is negative in office. Given length of sx and progression of sx will treat with azithromycin today. Pt reports he has taken this medication before with no SE or reactions.   Increase fluid intake and get plenty of rest.          If you have nasal congestions, post nasal drip, sinus pressure, runny nose you may try the following:        Clearing your sinuses in a nice steamy shower or in bathroom filled with steamy air.     Nasal saline rinses every 1-2 hours while awake may also help decrease nasal congestion, drainage.     You may try Mucinex D 12 hour version.  1/2 to 1 tablet one to two times a day as needed for nasal congestion, runny nose, post nasal drip, or cough.     If you have sore / scratch / irritated throat, you may try the following:          Warm salt water gargles every 1-2 hours while awake, throat lozenges, Tylenol and/or ibuprofen.       Please note that a cough is not necessarily a bad thing.  It is the body's way of protecting the airways.  If a cough is keeping you from sleeping at night, you may use cough suppressant such as Delsym Cough Syrup, NyQuil, Robitussin DM.       Most upper respiratory symptoms start to improve after 7-10 days but may take a few weeks to completely resolve.     You may also use Ibuprofen or Acetaminophen containing product for symptom relief.   Follow up in 1 week if your symptoms persist or worsen.    Please call the office if you have any questions.   The patient verbalized understanding of treatment plan.

## 2024-07-04 PROBLEM — J02.9 ACUTE PHARYNGITIS: Status: RESOLVED | Noted: 2024-06-04 | Resolved: 2024-07-04

## 2024-07-30 NOTE — PROGRESS NOTES
Adult Annual Physical  Name: Karl Shaikh      : 1975      MRN: [id number]  Encounter Provider: Lior Muniz DO  Encounter Date: 2024   Encounter department: [facility] IN PARTNERSHIP WITH [facility]    Assessment & Plan   1. Annual physical exam  -     CBC and differential  -     Comprehensive metabolic panel  -     Lipid Panel with Direct LDL reflex  -     POCT hemoglobin A1c  2. Encounter for vaccination  Comments:  No vaccines given  3. Hyperlipidemia, unspecified hyperlipidemia type  -     CBC and differential  -     Comprehensive metabolic panel  -     Lipid Panel with Direct LDL reflex  -     POCT hemoglobin A1c  4. Overweight (BMI 25.0-29.9)  -     CBC and differential  -     Comprehensive metabolic panel  -     Lipid Panel with Direct LDL reflex  -     POCT hemoglobin A1c  5. Prostate cancer screening  -     PSA, Total Screen    Immunizations and preventive care screenings were discussed with patient today. Appropriate education was printed on patient's after visit summary.      Counseling:  Dental Health: discussed importance of regular tooth brushing, flossing, and dental visits.  Exercise: the importance of regular exercise/physical activity was discussed. Recommend exercise 3-5 times per week for at least 30 minutes.       Depression Screening and Follow-up Plan: Patient was screened for depression during today's encounter. They screened negative with a PHQ-2 score of 0.        Patient was seen today for an annual physical exam. Age appropriate screening tests were done as above. Annual labs were ordered to be done through [facility]. Patient will be contacted regarding results and follow up will be based on findings. Patient was counseled on the importance of proper diet and exercise. I recommend diets rich in lean meats and leafy green vegetables. Try to have 150 minutes of exercise weekly at moderate intensity. See problem based charting for  any chronic problems or new findings and current workup/management.    40 minutes were spent on chart review, examination, and plan of care. This note was dictated using software.     History of Present Illness     Adult Annual Physical:  Patient presents for annual physical.     Diet and Physical Activity:  - Diet/Nutrition:. laying off some fast food  - Exercise: walking and moderate cardiovascular exercise.    Depression Screening:  - PHQ-2 Score: 0    General Health:  - Sleep: sleeps well.  - Hearing: normal hearing bilateral ears.  - Vision: no vision problems and wears glasses.  - Dental: regular dental visits.     Health:    - Urinary symptoms: none.     Review of Systems   Constitutional:  Negative for fever.   Respiratory:  Negative for shortness of breath.    Cardiovascular:  Negative for chest pain.   Gastrointestinal:  Negative for abdominal pain, constipation and diarrhea.   Genitourinary:  Negative for difficulty urinating.   Skin:  Negative for rash.         Objective     /84 (BP Location: Right arm, Patient Position: Sitting, Cuff Size: Large)   Pulse 70   Ht 6' (1.829 m)   Wt 91.2 kg (201 lb)   SpO2 99%   BMI 27.26 kg/m²     Physical Exam  Constitutional:       General: He is not in acute distress.     Appearance: Normal appearance.   HENT:      Head: Normocephalic and atraumatic.      Right Ear: Tympanic membrane, ear canal and external ear normal.      Left Ear: Tympanic membrane, ear canal and external ear normal.      Nose: Nose normal. No congestion.      Mouth/Throat:      Mouth: Mucous membranes are moist.      Pharynx: Oropharynx is clear. No oropharyngeal exudate or posterior oropharyngeal erythema.   Eyes:      Extraocular Movements: Extraocular movements intact.      Conjunctiva/sclera: Conjunctivae normal.      Pupils: Pupils are equal, round, and reactive to light.   Cardiovascular:      Rate and Rhythm: Normal rate and regular rhythm.      Heart sounds: Normal heart  sounds. No murmur heard.     No friction rub. No gallop.   Pulmonary:      Effort: Pulmonary effort is normal. No respiratory distress.      Breath sounds: Normal breath sounds. No wheezing.   Abdominal:      General: Abdomen is flat.      Palpations: Abdomen is soft.      Tenderness: There is no abdominal tenderness. There is no guarding.   Musculoskeletal:         General: Normal range of motion.      Cervical back: Normal range of motion and neck supple.   Lymphadenopathy:      Cervical: No cervical adenopathy.   Skin:     General: Skin is warm and dry.      Findings: No erythema or rash.   Neurological:      General: No focal deficit present.      Mental Status: He is alert and oriented to person, place, and time. Mental status is at baseline.   Psychiatric:         Mood and Affect: Mood normal.         Behavior: Behavior normal.         Thought Content: Thought content normal.         Judgment: Judgment normal.

## 2024-08-09 ENCOUNTER — OFFICE VISIT (OUTPATIENT)
Dept: FAMILY MEDICINE CLINIC | Facility: CLINIC | Age: 49
End: 2024-08-09

## 2024-08-09 VITALS
HEIGHT: 72 IN | SYSTOLIC BLOOD PRESSURE: 138 MMHG | WEIGHT: 201 LBS | HEART RATE: 70 BPM | DIASTOLIC BLOOD PRESSURE: 84 MMHG | BODY MASS INDEX: 27.22 KG/M2 | OXYGEN SATURATION: 99 %

## 2024-08-09 DIAGNOSIS — Z12.5 PROSTATE CANCER SCREENING: ICD-10-CM

## 2024-08-09 DIAGNOSIS — Z23 ENCOUNTER FOR VACCINATION: ICD-10-CM

## 2024-08-09 DIAGNOSIS — E78.5 HYPERLIPIDEMIA, UNSPECIFIED HYPERLIPIDEMIA TYPE: ICD-10-CM

## 2024-08-09 DIAGNOSIS — Z00.00 ANNUAL PHYSICAL EXAM: Primary | ICD-10-CM

## 2024-08-09 DIAGNOSIS — E66.3 OVERWEIGHT (BMI 25.0-29.9): ICD-10-CM

## 2024-08-09 LAB — SL AMB POCT HEMOGLOBIN AIC: 5.5 (ref ?–6.5)

## 2024-08-09 PROCEDURE — 83036 HEMOGLOBIN GLYCOSYLATED A1C: CPT | Performed by: STUDENT IN AN ORGANIZED HEALTH CARE EDUCATION/TRAINING PROGRAM

## 2024-08-09 PROCEDURE — 99396 PREV VISIT EST AGE 40-64: CPT | Performed by: STUDENT IN AN ORGANIZED HEALTH CARE EDUCATION/TRAINING PROGRAM

## 2024-08-09 PROCEDURE — 36415 COLL VENOUS BLD VENIPUNCTURE: CPT | Performed by: STUDENT IN AN ORGANIZED HEALTH CARE EDUCATION/TRAINING PROGRAM

## 2024-08-10 LAB
ALBUMIN SERPL-MCNC: 5.1 G/DL (ref 3.6–5.1)
ALBUMIN/GLOB SERPL: 1.9 (CALC) (ref 1–2.5)
ALP SERPL-CCNC: 74 U/L (ref 36–130)
ALT SERPL-CCNC: 16 U/L (ref 9–46)
AST SERPL-CCNC: 22 U/L (ref 10–40)
BASOPHILS # BLD AUTO: 20 CELLS/UL (ref 0–200)
BASOPHILS NFR BLD AUTO: 0.4 %
BILIRUB SERPL-MCNC: 0.8 MG/DL (ref 0.2–1.2)
BUN SERPL-MCNC: 14 MG/DL (ref 7–25)
BUN/CREAT SERPL: ABNORMAL (CALC) (ref 6–22)
CALCIUM SERPL-MCNC: 9.7 MG/DL (ref 8.6–10.3)
CHLORIDE SERPL-SCNC: 103 MMOL/L (ref 98–110)
CHOLEST SERPL-MCNC: 225 MG/DL
CHOLEST/HDLC SERPL: 5.4 (CALC)
CO2 SERPL-SCNC: 26 MMOL/L (ref 20–32)
CREAT SERPL-MCNC: 0.85 MG/DL (ref 0.6–1.29)
EOSINOPHIL # BLD AUTO: 100 CELLS/UL (ref 15–500)
EOSINOPHIL NFR BLD AUTO: 2 %
ERYTHROCYTE [DISTWIDTH] IN BLOOD BY AUTOMATED COUNT: 13.5 % (ref 11–15)
GFR/BSA.PRED SERPLBLD CYS-BASED-ARV: 109 ML/MIN/1.73M2
GLOBULIN SER CALC-MCNC: 2.7 G/DL (CALC) (ref 1.9–3.7)
GLUCOSE SERPL-MCNC: 101 MG/DL (ref 65–99)
HCT VFR BLD AUTO: 46.3 % (ref 38.5–50)
HDLC SERPL-MCNC: 42 MG/DL
HGB BLD-MCNC: 15.6 G/DL (ref 13.2–17.1)
LDLC SERPL CALC-MCNC: 153 MG/DL (CALC)
LYMPHOCYTES # BLD AUTO: 1430 CELLS/UL (ref 850–3900)
LYMPHOCYTES NFR BLD AUTO: 28.6 %
MCH RBC QN AUTO: 29.6 PG (ref 27–33)
MCHC RBC AUTO-ENTMCNC: 33.7 G/DL (ref 32–36)
MCV RBC AUTO: 87.9 FL (ref 80–100)
MONOCYTES # BLD AUTO: 535 CELLS/UL (ref 200–950)
MONOCYTES NFR BLD AUTO: 10.7 %
NEUTROPHILS # BLD AUTO: 2915 CELLS/UL (ref 1500–7800)
NEUTROPHILS NFR BLD AUTO: 58.3 %
NONHDLC SERPL-MCNC: 183 MG/DL (CALC)
PLATELET # BLD AUTO: 312 THOUSAND/UL (ref 140–400)
PMV BLD REES-ECKER: 9.3 FL (ref 7.5–12.5)
POTASSIUM SERPL-SCNC: 4.6 MMOL/L (ref 3.5–5.3)
PROT SERPL-MCNC: 7.8 G/DL (ref 6.1–8.1)
PSA SERPL-MCNC: 1.25 NG/ML
RBC # BLD AUTO: 5.27 MILLION/UL (ref 4.2–5.8)
SODIUM SERPL-SCNC: 138 MMOL/L (ref 135–146)
TRIGL SERPL-MCNC: 160 MG/DL
WBC # BLD AUTO: 5 THOUSAND/UL (ref 3.8–10.8)

## 2025-01-15 ENCOUNTER — OFFICE VISIT (OUTPATIENT)
Dept: FAMILY MEDICINE CLINIC | Facility: CLINIC | Age: 50
End: 2025-01-15

## 2025-01-15 VITALS
HEART RATE: 73 BPM | SYSTOLIC BLOOD PRESSURE: 144 MMHG | TEMPERATURE: 98.3 F | BODY MASS INDEX: 28.39 KG/M2 | DIASTOLIC BLOOD PRESSURE: 82 MMHG | WEIGHT: 209.6 LBS | RESPIRATION RATE: 18 BRPM | OXYGEN SATURATION: 99 % | HEIGHT: 72 IN

## 2025-01-15 DIAGNOSIS — R05.1 ACUTE COUGH: ICD-10-CM

## 2025-01-15 DIAGNOSIS — R03.0 ELEVATED BP WITHOUT DIAGNOSIS OF HYPERTENSION: ICD-10-CM

## 2025-01-15 DIAGNOSIS — J01.90 ACUTE SINUSITIS, RECURRENCE NOT SPECIFIED, UNSPECIFIED LOCATION: Primary | ICD-10-CM

## 2025-01-15 DIAGNOSIS — H92.09 EARACHE: ICD-10-CM

## 2025-01-15 DIAGNOSIS — J39.8 CONGESTION OF UPPER RESPIRATORY TRACT: ICD-10-CM

## 2025-01-15 DIAGNOSIS — R51.9 ACUTE NONINTRACTABLE HEADACHE, UNSPECIFIED HEADACHE TYPE: ICD-10-CM

## 2025-01-15 DIAGNOSIS — J02.9 SORE THROAT: ICD-10-CM

## 2025-01-15 LAB
SARS-COV-2 AG UPPER RESP QL IA: NEGATIVE
SL AMB POCT RAPID FLU A: NEGATIVE
SL AMB POCT RAPID FLU B: NEGATIVE
VALID CONTROL: NORMAL

## 2025-01-15 PROCEDURE — 99213 OFFICE O/P EST LOW 20 MIN: CPT | Performed by: STUDENT IN AN ORGANIZED HEALTH CARE EDUCATION/TRAINING PROGRAM

## 2025-01-15 PROCEDURE — 87804 INFLUENZA ASSAY W/OPTIC: CPT | Performed by: STUDENT IN AN ORGANIZED HEALTH CARE EDUCATION/TRAINING PROGRAM

## 2025-01-15 PROCEDURE — 87811 SARS-COV-2 COVID19 W/OPTIC: CPT | Performed by: STUDENT IN AN ORGANIZED HEALTH CARE EDUCATION/TRAINING PROGRAM

## 2025-01-15 RX ORDER — BENZONATATE 200 MG/1
200 CAPSULE ORAL 3 TIMES DAILY PRN
Qty: 60 CAPSULE | Refills: 0 | Status: SHIPPED | OUTPATIENT
Start: 2025-01-15

## 2025-01-15 RX ORDER — AZITHROMYCIN 250 MG/1
TABLET, FILM COATED ORAL
Qty: 6 TABLET | Refills: 0 | Status: SHIPPED | OUTPATIENT
Start: 2025-01-15 | End: 2025-01-20

## 2025-01-15 RX ORDER — ACETAMINOPHEN 325 MG/1
500 TABLET ORAL EVERY 6 HOURS PRN
COMMUNITY

## 2025-01-15 NOTE — PROGRESS NOTES
Name: Karl Shaikh      : 1975      MRN: [id number]  Encounter Provider: Lior Muniz DO  Encounter Date: 1/15/2025   Encounter department: [facility] IN [facility]NERSHIP WITH [facility]  :  Assessment & Plan  Acute sinusitis, recurrence not specified, unspecified location    Orders:    azithromycin (Zithromax) 250 mg tablet; Take 2 tablets (500 mg total) by mouth daily for 1 day, THEN 1 tablet (250 mg total) daily for 4 days.    Congestion of upper respiratory tract    Orders:    POCT Rapid Covid Ag    POCT rapid flu A and B    azithromycin (Zithromax) 250 mg tablet; Take 2 tablets (500 mg total) by mouth daily for 1 day, THEN 1 tablet (250 mg total) daily for 4 days.    Acute cough    Orders:    azithromycin (Zithromax) 250 mg tablet; Take 2 tablets (500 mg total) by mouth daily for 1 day, THEN 1 tablet (250 mg total) daily for 4 days.    benzonatate (TESSALON) 200 MG capsule; Take 1 capsule (200 mg total) by mouth 3 (three) times a day as needed for cough    Sore throat    Orders:    POCT Rapid Covid Ag    POCT rapid flu A and B    azithromycin (Zithromax) 250 mg tablet; Take 2 tablets (500 mg total) by mouth daily for 1 day, THEN 1 tablet (250 mg total) daily for 4 days.    Earache    Orders:    POCT Rapid Covid Ag    POCT rapid flu A and B    azithromycin (Zithromax) 250 mg tablet; Take 2 tablets (500 mg total) by mouth daily for 1 day, THEN 1 tablet (250 mg total) daily for 4 days.    Acute nonintractable headache, unspecified headache type    Orders:    POCT Rapid Covid Ag    POCT rapid flu A and B    azithromycin (Zithromax) 250 mg tablet; Take 2 tablets (500 mg total) by mouth daily for 1 day, THEN 1 tablet (250 mg total) daily for 4 days.    Elevated BP without diagnosis of hypertension    Orders:    azithromycin (Zithromax) 250 mg tablet; Take 2 tablets (500 mg total) by mouth daily for 1 day, THEN 1 tablet (250 mg total) daily for 4 days.          Patient  is a 49-year-old male presenting today with congestion, ear pain, sore throat, sinus pressure and cough.  He was negative for COVID and flu in office today.  I will treat him for acute sinusitis with a Z-Lai and Tessalon Perles for cough.  These were sent over to his pharmacy on file.  Blood pressure was slightly elevated today.  He has been working on losing weight and normally it is lower at home.  I think this is likely due to him being sick.  He will keep an eye on his blood pressure at home.    Recommend supportive care with fluids, rest, flonase 1-2 sprays each nostril, otc claritin or zyrtec daily and mucinex as directed. Tylenol recommended prn for pain or fever.  Instructed pt RTO if symptoms persist or worsen over the course of the next week      History of Present Illness     HPI  Review of Systems   Constitutional:  Positive for fatigue. Negative for fever.   HENT:  Positive for congestion, ear pain, sinus pressure and sore throat.    Respiratory:  Positive for cough. Negative for shortness of breath.    Cardiovascular:  Negative for chest pain.   Gastrointestinal:  Negative for abdominal pain, constipation and diarrhea.   Musculoskeletal:  Positive for myalgias.   Skin:  Negative for rash.   Neurological:  Positive for headaches.     Patient complains of congestion, sore throat, ear pain, headache.  Onset of symptoms was 2 weeks ago, and has been gradually worsening since that time. Treatment to date: cough suppressants and decongestants. Deep yellow discharge is noted and sinus pressure.    Objective   /82 (BP Location: Right arm, Patient Position: Sitting, Cuff Size: Large)   Pulse 73   Temp 98.3 °F (36.8 °C) (Temporal)   Resp 18   Ht 6' (1.829 m)   Wt 95.1 kg (209 lb 9.6 oz)   SpO2 99%   BMI 28.43 kg/m²      Physical Exam  Constitutional:       General: He is not in acute distress.     Appearance: Normal appearance. He is ill-appearing.   HENT:      Head: Normocephalic and atraumatic.       Right Ear: Tympanic membrane, ear canal and external ear normal.      Left Ear: Tympanic membrane, ear canal and external ear normal.      Nose: Congestion present.      Comments: Red nasal turbinates     Mouth/Throat:      Mouth: Mucous membranes are moist.      Pharynx: Oropharynx is clear. Posterior oropharyngeal erythema present. No oropharyngeal exudate.   Eyes:      Conjunctiva/sclera: Conjunctivae normal.   Cardiovascular:      Rate and Rhythm: Normal rate and regular rhythm.      Heart sounds: Normal heart sounds. No murmur heard.     No friction rub. No gallop.   Pulmonary:      Effort: Pulmonary effort is normal. No respiratory distress.      Breath sounds: Normal breath sounds. No wheezing.   Musculoskeletal:         General: Normal range of motion.      Cervical back: Neck supple.   Skin:     General: Skin is warm and dry.      Findings: No erythema or rash.   Neurological:      General: No focal deficit present.      Mental Status: He is alert and oriented to person, place, and time. Mental status is at baseline.   Psychiatric:         Mood and Affect: Mood normal.         Behavior: Behavior normal.         Thought Content: Thought content normal.         Judgment: Judgment normal.       Administrative Statements   I have spent a total time of 20 minutes in caring for this patient on the day of the visit/encounter including Instructions for management, Documenting in the medical record, and Obtaining or reviewing history  .

## 2025-07-07 NOTE — PROGRESS NOTES
Name: Karl Shaikh      : 1975      MRN: [id number]  Encounter Provider: Lior Muniz DO  Encounter Date: 2025   Encounter department: [facility]  :  Assessment & Plan  Piriformis syndrome of left side    Orders:    Ambulatory Referral to Physical Therapy; Future    Left leg pain    Orders:    Ambulatory Referral to Physical Therapy; Future    Elevated BP without diagnosis of hypertension  Blood pressure was noted to be elevated today in office.  Discussed with patient briefly and will recheck at his annual next month.  If blood pressure is staying elevated we may need to discuss management.  Patient does not want to go on blood pressure medication.                Patient is a 49-year-old male presenting today with leg pain.  His presentation seems to be more of a piriformis syndrome as he is not having any pain from the back.  His pain starts in the gluteal area and radiates down the leg with tingling towards the feet when he does any movement that exacerbates it.  I will refer him to physical therapy. Recommend rest, ice, and NSAIDs as needed.    History of Present Illness   HPI  Review of Systems   Constitutional:  Negative for fever.   Respiratory:  Negative for shortness of breath.    Cardiovascular:  Negative for chest pain.   Gastrointestinal:  Negative for abdominal pain, constipation and diarrhea.   Genitourinary:  Negative for difficulty urinating.   Musculoskeletal:  Positive for myalgias.   Skin:  Negative for rash.   Neurological:  Positive for numbness.     Patient is a 49-year-old male presenting today with concerns of leg pain. Patient states that th of May he pulled something in his left lateral thigh. He states that he has been compensating while walking. He notices some stiffness and numbness and tingling while walking.     Objective   There were no vitals taken for this visit.     Physical  Exam  Constitutional:       General: He is not in acute distress.     Appearance: Normal appearance.   HENT:      Head: Normocephalic and atraumatic.      Right Ear: External ear normal.      Left Ear: External ear normal.      Nose: Nose normal.      Mouth/Throat:      Mouth: Mucous membranes are moist.      Pharynx: Oropharynx is clear.     Eyes:      Conjunctiva/sclera: Conjunctivae normal.       Cardiovascular:      Rate and Rhythm: Normal rate and regular rhythm.   Pulmonary:      Effort: Pulmonary effort is normal. No respiratory distress.     Musculoskeletal:         General: Tenderness (Positive straight leg raise on the left, pain elicited near piriformis area and not in back) present.      Comments: No pain noted on internal or external rotation of the hip.  No pain noted on VI testing on the left     Skin:     General: Skin is warm and dry.      Findings: No erythema or rash.     Neurological:      General: No focal deficit present.      Mental Status: He is alert and oriented to person, place, and time. Mental status is at baseline.     Psychiatric:         Mood and Affect: Mood normal.         Behavior: Behavior normal.         Thought Content: Thought content normal.         Judgment: Judgment normal.       Administrative Statements   I have spent a total time of 20 minutes in caring for this patient on the day of the visit/encounter including Instructions for management, Documenting in the medical record, and Obtaining or reviewing history  .

## 2025-07-11 ENCOUNTER — OFFICE VISIT (OUTPATIENT)
Dept: FAMILY MEDICINE CLINIC | Facility: CLINIC | Age: 50
End: 2025-07-11

## 2025-07-11 VITALS
BODY MASS INDEX: 28.71 KG/M2 | HEIGHT: 72 IN | SYSTOLIC BLOOD PRESSURE: 142 MMHG | OXYGEN SATURATION: 99 % | WEIGHT: 212 LBS | HEART RATE: 66 BPM | DIASTOLIC BLOOD PRESSURE: 102 MMHG

## 2025-07-11 DIAGNOSIS — R03.0 ELEVATED BP WITHOUT DIAGNOSIS OF HYPERTENSION: ICD-10-CM

## 2025-07-11 DIAGNOSIS — M79.605 LEFT LEG PAIN: ICD-10-CM

## 2025-07-11 DIAGNOSIS — G57.02 PIRIFORMIS SYNDROME OF LEFT SIDE: Primary | ICD-10-CM

## 2025-07-11 PROCEDURE — 99213 OFFICE O/P EST LOW 20 MIN: CPT | Performed by: STUDENT IN AN ORGANIZED HEALTH CARE EDUCATION/TRAINING PROGRAM

## 2025-07-15 ENCOUNTER — EVALUATION (OUTPATIENT)
Age: 50
End: 2025-07-15
Payer: COMMERCIAL

## 2025-07-15 DIAGNOSIS — M79.605 LEFT LEG PAIN: ICD-10-CM

## 2025-07-15 DIAGNOSIS — G57.02 PIRIFORMIS SYNDROME OF LEFT SIDE: ICD-10-CM

## 2025-07-15 PROCEDURE — 97140 MANUAL THERAPY 1/> REGIONS: CPT | Performed by: PHYSICAL THERAPIST

## 2025-07-15 PROCEDURE — 97110 THERAPEUTIC EXERCISES: CPT | Performed by: PHYSICAL THERAPIST

## 2025-07-15 PROCEDURE — 97163 PT EVAL HIGH COMPLEX 45 MIN: CPT | Performed by: PHYSICAL THERAPIST

## 2025-07-18 ENCOUNTER — OFFICE VISIT (OUTPATIENT)
Age: 50
End: 2025-07-18
Payer: COMMERCIAL

## 2025-07-18 DIAGNOSIS — G57.02 PIRIFORMIS SYNDROME OF LEFT SIDE: Primary | ICD-10-CM

## 2025-07-18 DIAGNOSIS — M79.605 LEFT LEG PAIN: ICD-10-CM

## 2025-07-18 PROCEDURE — 97110 THERAPEUTIC EXERCISES: CPT | Performed by: PHYSICAL THERAPIST

## 2025-07-18 PROCEDURE — 97112 NEUROMUSCULAR REEDUCATION: CPT | Performed by: PHYSICAL THERAPIST

## 2025-07-18 PROCEDURE — 97140 MANUAL THERAPY 1/> REGIONS: CPT | Performed by: PHYSICAL THERAPIST

## 2025-07-18 NOTE — PROGRESS NOTES
"Daily Note     Today's date: 2025  Patient name: Karl Shaikh  : 1975  MRN: [id number]  Referring provider: Lior Muniz DO  Dx:   Encounter Diagnosis     ICD-10-CM    1. Piriformis syndrome of left side  G57.02       2. Left leg pain  M79.605           Start Time: 0745  Stop Time: 0835  Total time in clinic (min): 50 minutes    Subjective: Pt reports no sig change from IE. Has noticed catching toes on longer walks now that he's paying more attention, but can't remember if it chaged from before.        Objective: See treatment diary below        Assessment: pt demonstrates good initial response to POC. Discussed footwear options and LE biomechanics. Lumbopelvic exercises initiated. Pt would benefit from continued skilled PT to resolve remaining functional impairments and facilitate return to PLOF.       Plan: Continue per plan of care.  Progress treatment as tolerated.         Visit/Unit Tracking  AUTH Status:  Date 7/15/2025  7/18     Ins Type: AMA Used 1 2     Auth Req: No        PT/OT Limit: BOMN Remaining           Pertinent Findings:      POC End Date: 25                                                                                          Test / Measure  7/15/2025   FOTO (Predicted 79) 61   LLTT (+)   SLUMP (+)   Jogging P!     Precautions: n/a      Manuals 7/15/2025 7/18                        HVLAT lumbar roll SS SS           Lumbar STM  SS                        Neuro Re-Ed                          Sup bridge  20x5\"           Paloff  8# 10x5\" (B)           Clamshell  10x10\"                                                  Ther Ex             TM  2.2mph 6min           Seated sciatic flossing x15 2x15           Sciatic flossing c strap x15 2x15                                                               Edu 5'            Ther Activity                                       Gait Training                                       Modalities                                                "

## 2025-07-22 ENCOUNTER — OFFICE VISIT (OUTPATIENT)
Age: 50
End: 2025-07-22
Payer: COMMERCIAL

## 2025-07-22 DIAGNOSIS — G57.02 PIRIFORMIS SYNDROME OF LEFT SIDE: Primary | ICD-10-CM

## 2025-07-22 DIAGNOSIS — M79.605 LEFT LEG PAIN: ICD-10-CM

## 2025-07-22 PROCEDURE — 97140 MANUAL THERAPY 1/> REGIONS: CPT | Performed by: PHYSICAL THERAPIST

## 2025-07-22 PROCEDURE — 97112 NEUROMUSCULAR REEDUCATION: CPT | Performed by: PHYSICAL THERAPIST

## 2025-07-22 PROCEDURE — 97110 THERAPEUTIC EXERCISES: CPT | Performed by: PHYSICAL THERAPIST

## 2025-07-22 NOTE — PROGRESS NOTES
"Daily Note     Today's date: 2025  Patient name: Karl Shaikh  : 1975  MRN: [id number]  Referring provider: Lior Muniz DO  Dx:   Encounter Diagnosis     ICD-10-CM    1. Piriformis syndrome of left side  G57.02       2. Left leg pain  M79.605           Start Time: 0745  Stop Time: 0835  Total time in clinic (min): 50 minutes    Subjective: Pt denies catching toes since last session. Jogged outside yesterday for first time since injury.         Objective: See treatment diary below        Assessment: pt demonstrates good initial response to POC. Lumbopelvic exercises cont. Pt would benefit from continued skilled PT to resolve remaining functional impairments and facilitate return to PLOF.       Plan: Continue per plan of care.  Progress treatment as tolerated.         Visit/Unit Tracking  AUTH Status:  Date 7/15/2025  7/18 7/22    Ins Type: AMA Used 1 2 3    Auth Req: No        PT/OT Limit: BOMN Remaining           Pertinent Findings:      POC End Date: 25                                                                                          Test / Measure  7/15/2025   FOTO (Predicted 79) 61   LLTT (+)   SLUMP (+)   Jogging P!     Precautions: n/a      Manuals 7/15/2025 7/18 7/22                       HVLAT lumbar roll SS SS SS          Lumbar STM  SS SS                       Neuro Re-Ed                          Sup bridge  20x5\" 20x5\"          Paloff  8# 10x5\" (B) 10# 10x5\" (B)          Clamshell  10x10\" 10x10\"                                                 Ther Ex             TM  2.2mph 6min 2.2mph 6min          Seated sciatic flossing x15 2x15 2x15          Sciatic flossing c strap x15 2x15 2x15                                                              Edu 5'            Ther Activity                                       Gait Training                                       Modalities                                                "

## 2025-07-24 ENCOUNTER — OFFICE VISIT (OUTPATIENT)
Age: 50
End: 2025-07-24
Payer: COMMERCIAL

## 2025-07-24 DIAGNOSIS — G57.02 PIRIFORMIS SYNDROME OF LEFT SIDE: Primary | ICD-10-CM

## 2025-07-24 DIAGNOSIS — M79.605 LEFT LEG PAIN: ICD-10-CM

## 2025-07-24 PROCEDURE — 97112 NEUROMUSCULAR REEDUCATION: CPT

## 2025-07-24 PROCEDURE — 97110 THERAPEUTIC EXERCISES: CPT

## 2025-07-24 PROCEDURE — 97140 MANUAL THERAPY 1/> REGIONS: CPT

## 2025-07-24 NOTE — PROGRESS NOTES
"Daily Note     Today's date: 2025  Patient name: Karl Shaikh  : 1975  MRN: [id number]  Referring provider: Lior Muniz DO  Dx:   Encounter Diagnosis     ICD-10-CM    1. Piriformis syndrome of left side  G57.02       2. Left leg pain  M79.605                      Subjective: Patient reports some soreness inner left thigh/groin which is different than what he was experiencing. Was doing gardening the since last session and was kneeling with left leg up, likely responsible for the soreness he's currently feeling. Overall feeling improvement since starting therapy.         Objective: See treatment diary below        Assessment: pt demonstrates good initial response to POC. Continued with lumbopelvic exercises. Able to progress with neural tensioning vs flossing with good response. Encouraged to perform at home with education provided to avoid lingering symptoms, regress exercise back to supine with strap if this occurs. Verbalized understanding. Pt would benefit from continued skilled PT to resolve remaining functional impairments and facilitate return to PLOF.       Plan: Continue per plan of care.  Progress treatment as tolerated.         Visit/Unit Tracking  AUTH Status:  Date 7/15/2025  7/18 7/22 7/24   Ins Type: AMA Used 1 2 3 4   Auth Req: No        PT/OT Limit: BOMN Remaining           Pertinent Findings:      POC End Date: 25                                                                                          Test / Measure  7/15/2025   FOTO (Predicted 79) 61   LLTT (+)   SLUMP (+)   Jogging P!     Precautions: n/a      Manuals 7/15/2025 7/18 7/22 7/24                      HVLAT lumbar roll SS SS SS          Lumbar STM  SS SS CS                      Neuro Re-Ed                          Sup bridge  20x5\" 20x5\" 20x5\"          Paloff  8# 10x5\" (B) 10# 10x5\" (B) 10# 10x5\" (B)         Clamshell  10x10\" 10x10\" 10x5\"                                                 Ther Ex             TM  " 2.2mph 6min 2.2mph 6min 2.2 mph 7min         Seated sciatic flossing x15 2x15 2x15 20x ea tensioners          Sciatic flossing c strap x15 2x15 2x15 2x15         EIL    2x10                                                 Edu 5'            Ther Activity                                       Gait Training                                       Modalities

## 2025-07-29 ENCOUNTER — OFFICE VISIT (OUTPATIENT)
Age: 50
End: 2025-07-29
Payer: COMMERCIAL

## 2025-07-29 DIAGNOSIS — G57.02 PIRIFORMIS SYNDROME OF LEFT SIDE: Primary | ICD-10-CM

## 2025-07-29 DIAGNOSIS — M79.605 LEFT LEG PAIN: ICD-10-CM

## 2025-07-29 PROCEDURE — 97140 MANUAL THERAPY 1/> REGIONS: CPT

## 2025-07-29 PROCEDURE — 97112 NEUROMUSCULAR REEDUCATION: CPT

## 2025-07-29 PROCEDURE — 97110 THERAPEUTIC EXERCISES: CPT

## 2025-07-31 ENCOUNTER — OFFICE VISIT (OUTPATIENT)
Age: 50
End: 2025-07-31
Payer: COMMERCIAL

## 2025-07-31 DIAGNOSIS — G57.02 PIRIFORMIS SYNDROME OF LEFT SIDE: Primary | ICD-10-CM

## 2025-07-31 DIAGNOSIS — M79.605 LEFT LEG PAIN: ICD-10-CM

## 2025-07-31 PROCEDURE — 97112 NEUROMUSCULAR REEDUCATION: CPT

## 2025-07-31 PROCEDURE — 97140 MANUAL THERAPY 1/> REGIONS: CPT

## 2025-07-31 PROCEDURE — 97110 THERAPEUTIC EXERCISES: CPT

## 2025-08-07 ENCOUNTER — OFFICE VISIT (OUTPATIENT)
Age: 50
End: 2025-08-07
Payer: COMMERCIAL

## 2025-08-07 DIAGNOSIS — G57.02 PIRIFORMIS SYNDROME OF LEFT SIDE: Primary | ICD-10-CM

## 2025-08-07 DIAGNOSIS — M79.605 LEFT LEG PAIN: ICD-10-CM

## 2025-08-07 PROCEDURE — 97112 NEUROMUSCULAR REEDUCATION: CPT | Performed by: PHYSICAL THERAPIST

## 2025-08-07 PROCEDURE — 97110 THERAPEUTIC EXERCISES: CPT | Performed by: PHYSICAL THERAPIST

## 2025-08-11 ENCOUNTER — OFFICE VISIT (OUTPATIENT)
Age: 50
End: 2025-08-11
Payer: COMMERCIAL

## 2025-08-13 ENCOUNTER — OFFICE VISIT (OUTPATIENT)
Age: 50
End: 2025-08-13
Payer: COMMERCIAL

## 2025-08-15 ENCOUNTER — OFFICE VISIT (OUTPATIENT)
Dept: FAMILY MEDICINE CLINIC | Facility: CLINIC | Age: 50
End: 2025-08-15